# Patient Record
Sex: MALE | Race: WHITE | NOT HISPANIC OR LATINO | URBAN - METROPOLITAN AREA
[De-identification: names, ages, dates, MRNs, and addresses within clinical notes are randomized per-mention and may not be internally consistent; named-entity substitution may affect disease eponyms.]

---

## 2021-03-10 ENCOUNTER — APPOINTMENT (RX ONLY)
Dept: URBAN - METROPOLITAN AREA CLINIC 116 | Facility: CLINIC | Age: 71
Setting detail: DERMATOLOGY
End: 2021-03-10

## 2021-03-10 DIAGNOSIS — L30.9 DERMATITIS, UNSPECIFIED: ICD-10-CM | Status: INADEQUATELY CONTROLLED

## 2021-03-10 DIAGNOSIS — L29.89 OTHER PRURITUS: ICD-10-CM | Status: INADEQUATELY CONTROLLED

## 2021-03-10 DIAGNOSIS — D485 NEOPLASM OF UNCERTAIN BEHAVIOR OF SKIN: ICD-10-CM

## 2021-03-10 PROBLEM — L29.8 OTHER PRURITUS: Status: ACTIVE | Noted: 2021-03-10

## 2021-03-10 PROBLEM — D48.5 NEOPLASM OF UNCERTAIN BEHAVIOR OF SKIN: Status: ACTIVE | Noted: 2021-03-10

## 2021-03-10 PROCEDURE — ? PRESCRIPTION MEDICATION MANAGEMENT

## 2021-03-10 PROCEDURE — 69100 BIOPSY OF EXTERNAL EAR: CPT

## 2021-03-10 PROCEDURE — ? PRESCRIPTION

## 2021-03-10 PROCEDURE — ? ADDITIONAL NOTES

## 2021-03-10 PROCEDURE — ? COUNSELING

## 2021-03-10 PROCEDURE — 11103 TANGNTL BX SKIN EA SEP/ADDL: CPT | Mod: 59

## 2021-03-10 PROCEDURE — ? FOLLOW UP ORDERS

## 2021-03-10 PROCEDURE — ? BIOPSY BY SHAVE METHOD

## 2021-03-10 PROCEDURE — 11102 TANGNTL BX SKIN SINGLE LES: CPT | Mod: 59

## 2021-03-10 PROCEDURE — 99203 OFFICE O/P NEW LOW 30 MIN: CPT | Mod: 25

## 2021-03-10 RX ORDER — TRIAMCINOLONE ACETONIDE 1 MG/G
1 CREAM TOPICAL BID
Qty: 1 | Refills: 0 | Status: ERX | COMMUNITY
Start: 2021-03-10

## 2021-03-10 RX ADMIN — TRIAMCINOLONE ACETONIDE 1: 1 CREAM TOPICAL at 00:00

## 2021-03-10 ASSESSMENT — LOCATION SIMPLE DESCRIPTION DERM
LOCATION SIMPLE: LEFT THIGH
LOCATION SIMPLE: RIGHT FOREARM
LOCATION SIMPLE: RIGHT THIGH
LOCATION SIMPLE: LEFT FOREARM
LOCATION SIMPLE: LEFT EAR

## 2021-03-10 ASSESSMENT — LOCATION DETAILED DESCRIPTION DERM
LOCATION DETAILED: LEFT CYMBA CONCHA
LOCATION DETAILED: LEFT DISTAL DORSAL FOREARM
LOCATION DETAILED: RIGHT DISTAL RADIAL DORSAL FOREARM
LOCATION DETAILED: RIGHT ANTERIOR PROXIMAL THIGH
LOCATION DETAILED: LEFT ANTERIOR PROXIMAL THIGH
LOCATION DETAILED: RIGHT ANTERIOR DISTAL THIGH
LOCATION DETAILED: RIGHT PROXIMAL ULNAR DORSAL FOREARM

## 2021-03-10 ASSESSMENT — LOCATION ZONE DERM
LOCATION ZONE: ARM
LOCATION ZONE: EAR
LOCATION ZONE: LEG

## 2021-03-10 NOTE — PROCEDURE: ADDITIONAL NOTES
Detail Level: Detailed
Render Risk Assessment In Note?: no
Additional Notes: Patient is returning up [facility] soon.

## 2021-03-10 NOTE — PROCEDURE: FOLLOW UP ORDERS
Detail Level: Zone
Follow-Up Preamble: The following orders were made during the visit:
Follow-Up (Free Text): Patient is to follow up with his dermatologist up [facility]. Patient states he has an appointment scheduled for April to see his dermatologist up [facility] to follow up on dermatitis.

## 2021-03-10 NOTE — PROCEDURE: BIOPSY BY SHAVE METHOD
Detail Level: Detailed
Depth Of Biopsy: dermis
Was A Bandage Applied: Yes
Size Of Lesion In Cm: 0
Biopsy Type: H and E
Biopsy Method: Dermablade
Anesthesia Type: 1% lidocaine with epinephrine
Anesthesia Volume In Cc (Will Not Render If 0): 0.5
Additional Anesthesia Type: 0.5% lidocaine and 0.5% bupivacaine in a 1:1 solution with 1:200,000 epinephrine and a 1:10 solution of 8.4% sodium bicarbonate
Hemostasis: Aluminum Chloride
Wound Care: Petrolatum
Dressing: bandage
Destruction After The Procedure: No
Type Of Destruction Used: Curettage
Curettage Text: The wound bed was treated with curettage after the biopsy was performed.
Cryotherapy Text: The wound bed was treated with cryotherapy after the biopsy was performed.
Electrodesiccation Text: The wound bed was treated with electrodesiccation after the biopsy was performed.
Electrodesiccation And Curettage Text: The wound bed was treated with electrodesiccation and curettage after the biopsy was performed.
Silver Nitrate Text: The wound bed was treated with silver nitrate after the biopsy was performed.
Lab: [facility]
Lab Facility: [id number]
Consent: The provider's intent is to obtain a tissue sample solely for diagnostic purposes. Written consent to obtain tissue sample was obtained and risks were reviewed including but not limited to scarring, infection, bleeding, scabbing, incomplete removal, nerve damage and allergy to anesthesia.
Post-Care Instructions: I reviewed with the patient in detail post-care instructions. Patient is to keep the biopsy site dry overnight, and then apply bacitracin twice daily until healed. Patient may apply hydrogen peroxide soaks to remove any crusting.
Notification Instructions: Patient will be notified of biopsy results. However, patient instructed to call the office if not contacted within 2 weeks.
Billing Type: [employer]
Information: Selecting Yes will display possible errors in your note based on the variables you have selected. This validation is only offered as a suggestion for you. PLEASE NOTE THAT THE VALIDATION TEXT WILL BE REMOVED WHEN YOU FINALIZE YOUR NOTE. IF YOU WANT TO FAX A PRELIMINARY NOTE YOU WILL NEED TO TOGGLE THIS TO 'NO' IF YOU DO NOT WANT IT IN YOUR FAXED NOTE.
Lab: [id number]
Lab Facility: [id number]
Billing Type: Third-Party Bill
Size Of Lesion In Cm: 1.5
Biopsy Method: Personna blade

## 2021-03-10 NOTE — PROCEDURE: PRESCRIPTION MEDICATION MANAGEMENT
Detail Level: Zone
Initiate Treatment: triamcinolone acetonide 0.1 % topical cream: Apply to AA BID X 2 weeks break for 2 weeks repeat cycle as needed (never to face)\\n\\nPatient is to follow a strict sunscreen regimen and sensitive skin regimen
Render In Strict Bullet Format?: No

## 2021-03-16 ENCOUNTER — RX ONLY (OUTPATIENT)
Age: 71
Setting detail: RX ONLY
End: 2021-03-16

## 2021-03-16 RX ORDER — CLOBETASOL PROPIONATE 0.5 MG/G
1 CREAM TOPICAL BID
Qty: 1 | Refills: 0 | Status: ERX | COMMUNITY
Start: 2021-03-16

## 2023-10-02 ENCOUNTER — TREATMENT (OUTPATIENT)
Dept: PHYSICAL THERAPY | Facility: CLINIC | Age: 73
End: 2023-10-02
Payer: MEDICARE

## 2023-10-02 DIAGNOSIS — R26.9 GAIT ABNORMALITY: Primary | ICD-10-CM

## 2023-10-02 DIAGNOSIS — M54.16 LUMBAR RADICULITIS: ICD-10-CM

## 2023-10-02 DIAGNOSIS — M25.60 STIFFNESS OF UNSPECIFIED JOINT, NOT ELSEWHERE CLASSIFIED: ICD-10-CM

## 2023-10-02 DIAGNOSIS — R29.898 WEAKNESS OF BOTH HIPS: ICD-10-CM

## 2023-10-02 DIAGNOSIS — M54.50 LOW BACK PAIN: ICD-10-CM

## 2023-10-02 PROCEDURE — 97113 AQUATIC THERAPY/EXERCISES: CPT | Mod: GP

## 2023-10-02 NOTE — PROGRESS NOTES
"Physical Therapy    Physical Therapy Treatment    Patient Name: Darius Walton  MRN: [id number]  Today's Date: 10/2/2023  Time Calculation  Start Time: 905  Stop Time: 930  Time Calculation (min): 25 min  Insurance    Insurance reviewed   Visit number: 10  Approved number of visits: 10   Authorization date range: 11/10/23   Authorization required after evaluation  Beginnin2023 Endin2023  Insurance        Assessment: Pt demos improved trunk and core stability and control.       Plan: Extend POC      Current Problem  1. Gait abnormality        2. Low back pain        3. Lumbar radiculitis        4. Weakness of both hips                            Subjective   Pt states that he just returned from vacation and had no increased in overall LBP or radicular symptoms .       Precaution : none       Pain 2/10        Objective   Fair  DLS with paddle resistance level 5         Treatments:     Aquatic Therapy (97524): timed minutes 30, units 2 .   9:05-9:30  Aquatic Gait Activities: (fwd, bkwd, lat) 2 laps each  March in Place w/AB: x20  HR/TR: x20  Hip PRE's (abd, ext, flex): 2 x10 each, ROB  Squats: x10  SKTC: 15\" x2 ROB  HS Stretch: 15\" x2, ROB  Noodle Rotation: x5 each  Noodle Press Down: x10  Staggered Stance Row: paddles, Lv5 x20  DLS w/UE movement: Level 5 x10 each.   DB SL UE Flexion/ABD Double blue x10 each      "

## 2023-10-02 NOTE — Clinical Note
October 2, 2023     Patient: Darius Walton   YOB: 1950   Date of Visit: 10/2/2023       To Whom It May Concern:    It is my medical opinion that Darius Walton {Work release (duty restriction):88546}.    If you have any questions or concerns, please don't hesitate to call.         Sincerely,        Lory Francois, PTA    CC: No Recipients

## 2023-10-02 NOTE — Clinical Note
October 2, 2023     Patient: Darius Walton   YOB: 1950   Date of Visit: 10/2/2023       To Whom it May Concern:    Darius Walton was seen in my clinic on 10/2/2023. He {Return to school/sport:88134}.    If you have any questions or concerns, please don't hesitate to call.         Sincerely,          Lory Francois, PTA        CC: No Recipients

## 2023-10-05 PROBLEM — M25.651 STIFFNESS OF RIGHT HIP JOINT: Status: ACTIVE | Noted: 2023-10-05

## 2023-10-05 PROBLEM — M25.60 JOINT STIFFNESS OF SPINE: Status: ACTIVE | Noted: 2023-10-05

## 2023-10-05 RX ORDER — IBUPROFEN 600 MG/1
600 TABLET ORAL EVERY 8 HOURS PRN
COMMUNITY
Start: 2023-08-24

## 2023-10-05 RX ORDER — PREDNISONE 10 MG/1
3 TABLET ORAL
COMMUNITY
Start: 2022-04-01

## 2023-10-05 RX ORDER — MUPIROCIN 20 MG/G
OINTMENT TOPICAL
COMMUNITY
Start: 2022-11-04

## 2023-10-05 RX ORDER — FLUCONAZOLE 100 MG/1
100 TABLET ORAL
COMMUNITY
Start: 2022-04-22

## 2023-10-05 RX ORDER — PRAVASTATIN SODIUM 40 MG/1
40 TABLET ORAL NIGHTLY
COMMUNITY
Start: 2023-07-17

## 2023-10-05 RX ORDER — TIZANIDINE 4 MG/1
4 TABLET ORAL EVERY 8 HOURS PRN
COMMUNITY
Start: 2023-08-08

## 2023-10-05 RX ORDER — METHYLPREDNISOLONE 4 MG/1
TABLET ORAL
COMMUNITY
Start: 2023-07-12

## 2023-10-05 RX ORDER — METHOTREXATE 2.5 MG/1
TABLET ORAL
COMMUNITY
Start: 2023-03-02

## 2023-10-05 RX ORDER — DUPILUMAB 300 MG/2ML
INJECTION, SOLUTION SUBCUTANEOUS
COMMUNITY
Start: 2023-09-08

## 2023-10-05 RX ORDER — NAPROXEN 500 MG/1
500 TABLET ORAL 2 TIMES DAILY PRN
COMMUNITY
Start: 2022-11-07

## 2023-10-05 RX ORDER — FLUOCINOLONE ACETONIDE 0.11 MG/ML
OIL TOPICAL
COMMUNITY
Start: 2023-04-20

## 2023-10-05 RX ORDER — BETAMETHASONE DIPROPIONATE 0.5 MG/G
CREAM TOPICAL
COMMUNITY
Start: 2023-01-23

## 2023-10-05 RX ORDER — LISINOPRIL 5 MG/1
TABLET ORAL
COMMUNITY
Start: 2023-09-29

## 2023-10-05 RX ORDER — DOXAZOSIN 2 MG/1
2 TABLET ORAL DAILY
COMMUNITY
Start: 2023-08-20

## 2023-10-05 RX ORDER — DESOXIMETASONE 2.5 MG/G
1 CREAM TOPICAL
COMMUNITY
Start: 2022-01-28

## 2023-10-05 RX ORDER — FOLIC ACID 1 MG/1
1 TABLET ORAL DAILY
COMMUNITY
Start: 2023-03-30

## 2023-10-06 ENCOUNTER — TELEPHONE (OUTPATIENT)
Dept: PHYSICAL THERAPY | Facility: CLINIC | Age: 73
End: 2023-10-06

## 2023-10-06 ENCOUNTER — TREATMENT (OUTPATIENT)
Dept: PHYSICAL THERAPY | Facility: CLINIC | Age: 73
End: 2023-10-06
Payer: MEDICARE

## 2023-10-06 DIAGNOSIS — M25.60 JOINT STIFFNESS OF SPINE: ICD-10-CM

## 2023-10-06 DIAGNOSIS — M54.50 LOW BACK PAIN: ICD-10-CM

## 2023-10-06 DIAGNOSIS — M54.16 LUMBAR RADICULITIS: Primary | ICD-10-CM

## 2023-10-06 PROCEDURE — 97110 THERAPEUTIC EXERCISES: CPT | Mod: GP | Performed by: GENERAL ACUTE CARE HOSPITAL

## 2023-10-06 PROCEDURE — 97014 ELECTRIC STIMULATION THERAPY: CPT | Mod: GP | Performed by: GENERAL ACUTE CARE HOSPITAL

## 2023-10-06 PROCEDURE — 97140 MANUAL THERAPY 1/> REGIONS: CPT | Mod: GP | Performed by: GENERAL ACUTE CARE HOSPITAL

## 2023-10-06 ASSESSMENT — PAIN SCALES - GENERAL: PAINLEVEL_OUTOF10: 3

## 2023-10-06 ASSESSMENT — ENCOUNTER SYMPTOMS
OCCASIONAL FEELINGS OF UNSTEADINESS: 0
LOSS OF SENSATION IN FEET: 1
DEPRESSION: 0

## 2023-10-06 NOTE — PROGRESS NOTES
Physical Therapy    Physical Therapy Treatment    Patient Name: Darius Walton  MRN: [id number]  Today's Date: 10/6/2023      Current Problem  1. Lumbar radiculitis        2. Joint stiffness of spine        3. Low back pain               Precautions  Precautions  STEADI Fall Risk Score (The score of 4 or more indicates an increased risk of falling): 4  Vital Signs     Pain  Pain Score: 3 (sore)    Change in function: able to golf with soreness  Patient comments: sore   Objective: HEP review   Treatment:  Therapeutic exercise (25842): timed minutes 10 . nustep   stretches.   Manual Therapy (10675): timed minutes 35 . STM LS paraspinals right glute and glute med   UPA Bilat L1L2L3   IDN L3L4L5 bilateral with estim. glute med sup gluteal thoracic/lumbar paraspinals left   Iliacus release.   Modalities: untimed minutes 10 . estim with IDN.    Assessment:  Patient educated in dry needling precautions, indications, and contraindications. Patient is aware of the risks and benefits of procedure and wishes to have it performed. No adverse reaction to the dry needling noted.   Patient notes improved pain levels resultant from activities in therapy session. Improved tissue quality noted as well as body mechanics demonstrated after cueing and corrections. Patient continues to require active therapy to progress functional capabilities with decreasing pain levels and improving mechanics with functional activities including progression of Home exercise program. Tolerance to today's treatment was good. Muscle fatigue noted.  Patient appears motivated and compliant this date, demonstrating a working understanding of principals instructed and home program.  Plan:  Progress with functional strength as tolerated with respect to tissue healing. Manual therapy PRN to improve/maintain ROM, prevent adhesion, reduce pain.

## 2023-10-09 ENCOUNTER — TREATMENT (OUTPATIENT)
Dept: PHYSICAL THERAPY | Facility: CLINIC | Age: 73
End: 2023-10-09
Payer: MEDICARE

## 2023-10-09 DIAGNOSIS — M25.60 JOINT STIFFNESS OF SPINE: ICD-10-CM

## 2023-10-09 DIAGNOSIS — M54.50 LOW BACK PAIN: ICD-10-CM

## 2023-10-09 DIAGNOSIS — M54.16 LUMBAR RADICULITIS: Primary | ICD-10-CM

## 2023-10-09 PROCEDURE — 97113 AQUATIC THERAPY/EXERCISES: CPT | Mod: GP,CQ

## 2023-10-09 ASSESSMENT — PAIN SCALES - GENERAL: PAINLEVEL_OUTOF10: 2

## 2023-10-09 ASSESSMENT — PAIN - FUNCTIONAL ASSESSMENT: PAIN_FUNCTIONAL_ASSESSMENT: 0-10

## 2023-10-09 NOTE — PROGRESS NOTES
"Physical Therapy    Physical Therapy Treatment    Patient Name: Darius Walton  MRN: [id number]  Today's Date: 10/9/2023  Time Calculation  Start Time: 0930  Stop Time: 1000  Time Calculation (min): 30 min      Assessment:   Pt demos improved core and trunk stabilization .    Plan:   Progress as able     Current Problem  1. Lumbar radiculitis        2. Joint stiffness of spine        3. Low back pain            Subjective   General   States that he went golfing over the weekend and did really well. Had increased walking duration without significant increased pain.  Precautions  Precautions  Precautions Comment: No recent falls    Pain  Pain Assessment: 0-10  Pain Score: 2    Objective   Fair + DLS     Treatments:  Aquatic Therapy (95278): timed minutes 30, units 2 .   Aquatic Gait Activities: (fwd, bkwd, lat) 2 laps each  March in Place w/AB: x20  HR/TR: x20  Hip PRE's (abd, ext, flex): 2 x10 each, ROB  Squats: x10  SKTC: 15\" x2 ROB  HS Stretch: 15\" x2, ROB  Noodle Rotation: x5 each  Noodle Press Down: x10 knot   Staggered Stance Row: paddles, Lv5 x20 NV   DB DLS SL White and black DB flexion /abduction x10 each  DLS w/UE movement: Level 5 x10 each.   NV      "

## 2023-10-12 ENCOUNTER — TREATMENT (OUTPATIENT)
Dept: PHYSICAL THERAPY | Facility: CLINIC | Age: 73
End: 2023-10-12
Payer: MEDICARE

## 2023-10-12 DIAGNOSIS — M54.16 LUMBAR RADICULITIS: Primary | ICD-10-CM

## 2023-10-12 DIAGNOSIS — M54.50 LOW BACK PAIN: ICD-10-CM

## 2023-10-12 DIAGNOSIS — M25.60 JOINT STIFFNESS OF SPINE: ICD-10-CM

## 2023-10-12 PROCEDURE — 97110 THERAPEUTIC EXERCISES: CPT | Mod: GP,CQ

## 2023-10-12 PROCEDURE — 97140 MANUAL THERAPY 1/> REGIONS: CPT | Mod: GP,CQ

## 2023-10-12 ASSESSMENT — PAIN SCALES - GENERAL: PAINLEVEL_OUTOF10: 2

## 2023-10-12 ASSESSMENT — PAIN - FUNCTIONAL ASSESSMENT: PAIN_FUNCTIONAL_ASSESSMENT: 0-10

## 2023-10-12 NOTE — PROGRESS NOTES
Physical Therapy    Physical Therapy Treatment    Patient Name: Darius Walton  MRN: [id number]  Today's Date: 10/12/2023  Time Calculation  Start Time: 0900  Stop Time: 0945  Time Calculation (min): 45 min      Assessment:  Improved tissue quality noted post MTT.    Plan:  Cont with POC     Current Problem  1. Lumbar radiculitis        2. Joint stiffness of spine        3. Low back pain            Subjective   States that he is stiff and sore today . C/o R posterior shld soreness as well due to lifting something out of trunk possibly straining shld.    Precautions  Precautions  Precautions Comment: No recent Falls    Pain  Pain Assessment: 0-10  Pain Score: 2 (Soreness more than pain)    Objective   + R glute spasm  Treatments:    Therapeutic exercise (64497): 10 minutes 1 unit  Nustep  5 minutes   HS/Hip flexor standing 1x30 each   Supine trunk rotation with SB 5 seconds x10        MTT (04608) :35 minutes 2 units        Hip flexor /Iliacus release R Glute STM/DTM . IASTM Lumbar paraspinals. CHR R QL .MFR across lumbar spine .

## 2023-10-16 ENCOUNTER — TREATMENT (OUTPATIENT)
Dept: PHYSICAL THERAPY | Facility: CLINIC | Age: 73
End: 2023-10-16
Payer: MEDICARE

## 2023-10-16 DIAGNOSIS — M54.50 LOW BACK PAIN: Chronic | ICD-10-CM

## 2023-10-16 DIAGNOSIS — M25.60 STIFFNESS OF UNSPECIFIED JOINT, NOT ELSEWHERE CLASSIFIED: ICD-10-CM

## 2023-10-16 DIAGNOSIS — M54.16 LUMBAR RADICULITIS: Primary | ICD-10-CM

## 2023-10-16 DIAGNOSIS — M25.60 JOINT STIFFNESS OF SPINE: ICD-10-CM

## 2023-10-16 PROCEDURE — 97113 AQUATIC THERAPY/EXERCISES: CPT | Mod: GP,CQ

## 2023-10-16 ASSESSMENT — PAIN SCALES - GENERAL: PAINLEVEL_OUTOF10: 2

## 2023-10-16 ASSESSMENT — PAIN - FUNCTIONAL ASSESSMENT: PAIN_FUNCTIONAL_ASSESSMENT: 0-10

## 2023-10-16 NOTE — PROGRESS NOTES
"Physical Therapy    Physical Therapy Treatment    Patient Name: Darius Walton  MRN: [id number]  Today's Date: 10/16/2023  Time Calculation  Start Time: 0930  Stop Time: 0100  Time Calculation (min): 930 min      Assessment:  Pt demos improved trunk and core control .    Plan:  Cont with POC    Current Problem  1. Lumbar radiculitis        2. Joint stiffness of spine        3. Low back pain            Subjective   Symptoms still remain per pt.  Precautions  Precautions  Precautions Comment: No recent falls    Pain  Pain Assessment: 0-10  Pain Score: 2    Objective   Fair DLS     Treatments:  Aquatic Therapy (17375): timed minutes 30, units 2 .   Aquatic Gait Activities: (fwd, bkwd, lat) 2 laps each  March in Place w/AB: x20  HR/TR: x20  Hip PRE's (abd, ext, flex): 2 x10 each, ROB  Squats: x10  SKTC: 15\" x2 ROB  HS Stretch: 15\" x2, ROB  Noodle Rotation: x5 each  Noodle Press Down: x20 knot   Staggered Stance Row: paddles, Lv5 x20 NV   DB DLS SL White and black DB flexion /abduction x10 each  DLS w/UE movement: Level 5 x10 each.   NV              "

## 2023-10-20 ENCOUNTER — TREATMENT (OUTPATIENT)
Dept: PHYSICAL THERAPY | Facility: CLINIC | Age: 73
End: 2023-10-20
Payer: MEDICARE

## 2023-10-20 DIAGNOSIS — M54.16 LUMBAR RADICULITIS: ICD-10-CM

## 2023-10-20 DIAGNOSIS — M54.50 LOW BACK PAIN: ICD-10-CM

## 2023-10-20 DIAGNOSIS — M25.60 JOINT STIFFNESS OF SPINE: Primary | ICD-10-CM

## 2023-10-20 PROCEDURE — 97140 MANUAL THERAPY 1/> REGIONS: CPT | Mod: GP | Performed by: GENERAL ACUTE CARE HOSPITAL

## 2023-10-20 PROCEDURE — 97110 THERAPEUTIC EXERCISES: CPT | Mod: GP | Performed by: GENERAL ACUTE CARE HOSPITAL

## 2023-10-20 PROCEDURE — 97014 ELECTRIC STIMULATION THERAPY: CPT | Mod: GP | Performed by: GENERAL ACUTE CARE HOSPITAL

## 2023-10-20 NOTE — PROGRESS NOTES
Patient Name: Darius Watlon  MRN: [id number]  Today's Date: 10/20/2023     Current Problem:  1. Joint stiffness of spine        2. Lumbar radiculitis        3. Low back pain            Subjective:  Change in pain/ pain level: 3/10  Change in function: stiff in the am   Patient comments: play golf much better in the afternoon      Therapeutic exercise (89384): timed minutes 10 . nustep   stretches.   Manual Therapy (54477): timed minutes 35 . STM LS paraspinals right glute and glute med   UPA Bilat L1L2L3   IDN L3L4L5 bilateral with estim. glute med sup gluteal thoracic/lumbar paraspinals left   Iliacus release.   Modalities:  estim with IDN    Assessment:  Patient educated in dry needling precautions, indications, and contraindications. Patient is aware of the risks and benefits of procedure and wishes to have it performed. No adverse reaction to the dry needling noted.   Patient notes improved pain levels resultant from activities in therapy session. Improved tissue quality noted as well as body mechanics demonstrated after cueing and corrections. Patient continues to require active therapy to progress functional capabilities with decreasing pain levels and improving mechanics with functional activities including progression of Home exercise program. Tolerance to today's treatment was good. Muscle fatigue noted.  Patient appears motivated and compliant this date, demonstrating a working understanding of principals instructed and home program.    Plan:  Progress with functional strength as tolerated with respect to tissue healing. Manual therapy PRN to improve/maintain ROM, prevent adhesion, reduce pain.

## 2023-10-23 ENCOUNTER — TREATMENT (OUTPATIENT)
Dept: PHYSICAL THERAPY | Facility: CLINIC | Age: 73
End: 2023-10-23
Payer: MEDICARE

## 2023-10-23 DIAGNOSIS — M54.50 LOW BACK PAIN: ICD-10-CM

## 2023-10-23 DIAGNOSIS — M25.60 JOINT STIFFNESS OF SPINE: Primary | ICD-10-CM

## 2023-10-23 DIAGNOSIS — M54.16 LUMBAR RADICULITIS: ICD-10-CM

## 2023-10-23 PROCEDURE — 97113 AQUATIC THERAPY/EXERCISES: CPT | Mod: GP,CQ

## 2023-10-23 ASSESSMENT — PAIN SCALES - GENERAL: PAINLEVEL_OUTOF10: 2

## 2023-10-23 ASSESSMENT — PAIN - FUNCTIONAL ASSESSMENT: PAIN_FUNCTIONAL_ASSESSMENT: 0-10

## 2023-10-23 NOTE — PROGRESS NOTES
"Patient Name: Darius Walton  MRN: [id number]  Today's Date: 10/23/2023  Time Calculation  Start Time: 0930  Stop Time: 1000  Time Calculation (min): 30 min    Subjective:  States that he continues to have LBP and stiffness but feels that PT is helping.    Objective:  Aquatic performance independently recalled 50% of current program.    Assessment:   Pt demos improved recall of proper form and technique with session .    Plan:   Continue with POC    Treatment :   Aquatic Therapy (96621): timed minutes 30, units 2 .   Aquatic Gait Activities: (fwd, bkwd, lat) 2 laps each  March in Place w/AB: x20  HR/TR: x20  Hip PRE's (abd, ext, flex): 2 x10 each, ROB  Squats: x10  SKTC: 15\" x2 ROB  HS Stretch: 15\" x2, ROB  Noodle Rotation: x5 each  Noodle Press Down: x20 knot   Staggered Stance Row: paddles, Lv5 x20 NV   DB DLS SL White and black DB flexion /abduction x10 each  DLS w/UE movement: Level 5 x10 each.   NV              Current Problem:  1. Joint stiffness of spine        2. Lumbar radiculitis        3. Low back pain                Pain:  Pain Assessment: 0-10  Pain Score: 2          Precautions:   Precautions  Precautions Comment: No recent falls     "

## 2023-10-27 ENCOUNTER — APPOINTMENT (OUTPATIENT)
Dept: PHYSICAL THERAPY | Facility: CLINIC | Age: 73
End: 2023-10-27
Payer: MEDICARE

## 2023-10-30 ENCOUNTER — TREATMENT (OUTPATIENT)
Dept: PHYSICAL THERAPY | Facility: CLINIC | Age: 73
End: 2023-10-30
Payer: MEDICARE

## 2023-10-30 DIAGNOSIS — M54.16 LUMBAR RADICULITIS: ICD-10-CM

## 2023-10-30 DIAGNOSIS — M54.50 LOW BACK PAIN: ICD-10-CM

## 2023-10-30 DIAGNOSIS — M25.60 JOINT STIFFNESS OF SPINE: Primary | ICD-10-CM

## 2023-10-30 PROCEDURE — 97113 AQUATIC THERAPY/EXERCISES: CPT | Mod: GP,CQ

## 2023-10-30 ASSESSMENT — PAIN SCALES - GENERAL: PAINLEVEL_OUTOF10: 0 - NO PAIN

## 2023-10-30 ASSESSMENT — PAIN - FUNCTIONAL ASSESSMENT: PAIN_FUNCTIONAL_ASSESSMENT: 0-10

## 2023-10-30 NOTE — PROGRESS NOTES
"Patient Name: Darius Walton  MRN: [id number]  Today's Date: 10/30/2023  Time Calculation  Start Time: 0930  Stop Time: 1000  Time Calculation (min): 30 min    Subjective:  Reports stiffness and soreness in LB. Denies any radicular pain currently.     Objective:  Good - DLS     Assessment:   Pt demos improved postural awareness and improved core and trunk stability.    Plan:   Cont with POC     Treatment :    Aquatic Therapy (08251): timed minutes 30, units 2 .   Aquatic Gait Activities: (fwd, bkwd, lat) 2 laps each  March in Place w/AB: x20  HR/TR: x20  Hip PRE's (abd, ext, flex): 2 x10 each, ROB  Squats: x10  SKTC: 15\" x2 ROB  HS Stretch: 15\" x2, ROB  Noodle Rotation: x5 each  Noodle Press Down: x20 knot   Staggered Stance Row: paddles, Lv5 x20 NV   DB DLS SL White and black DB flexion /abduction x10 each  DLS w/UE movement: Level 5 x10 each.       Current Problem:  1. Joint stiffness of spine        2. Lumbar radiculitis        3. Low back pain                Pain:  0/10 pain level currently   Location: LB   Description: Stiffness/Soreness in LB           Precautions:   Precautions  Precautions Comment: No recent falls     "

## 2023-11-02 ENCOUNTER — TREATMENT (OUTPATIENT)
Dept: PHYSICAL THERAPY | Facility: CLINIC | Age: 73
End: 2023-11-02
Payer: MEDICARE

## 2023-11-02 DIAGNOSIS — M25.60 JOINT STIFFNESS OF SPINE: Primary | ICD-10-CM

## 2023-11-02 DIAGNOSIS — M54.50 LOW BACK PAIN: ICD-10-CM

## 2023-11-02 DIAGNOSIS — R29.898 WEAKNESS OF BOTH HIPS: ICD-10-CM

## 2023-11-02 DIAGNOSIS — M25.60 STIFFNESS OF UNSPECIFIED JOINT, NOT ELSEWHERE CLASSIFIED: ICD-10-CM

## 2023-11-02 DIAGNOSIS — R26.9 GAIT ABNORMALITY: ICD-10-CM

## 2023-11-02 DIAGNOSIS — M54.16 LUMBAR RADICULITIS: ICD-10-CM

## 2023-11-02 PROCEDURE — 97140 MANUAL THERAPY 1/> REGIONS: CPT | Mod: GP,CQ

## 2023-11-02 PROCEDURE — 97110 THERAPEUTIC EXERCISES: CPT | Mod: GP,CQ

## 2023-11-02 ASSESSMENT — PAIN SCALES - GENERAL: PAINLEVEL_OUTOF10: 2

## 2023-11-02 ASSESSMENT — PAIN - FUNCTIONAL ASSESSMENT: PAIN_FUNCTIONAL_ASSESSMENT: 0-10

## 2023-11-02 NOTE — PROGRESS NOTES
Patient Name: Darius Walton  MRN: [id number]  Today's Date: 11/2/2023  Time Calculation  Start Time: 0145  Stop Time: 0225  Time Calculation (min): 40 min    Subjective:  Reports that he has been sitting for longer periods recently so he has increased LB tightness. Occasionally radicular symptoms with radiate down R LE into lateral foot.     Objective:  Gait fwd flexed     Assessment:   Improved upright posture post session.     Plan:   Cont with POC.  reeval with supervising PT next week.    Treatment :     THERAPEUTIC EXERCISE 91184 15 minutes 1 unit   Nustep 5 minutes   Standing hamstring/hip flexor/gs   Clamshells x10  MANUAL TECHNIQUES  93886 25 2 units    Stm lumbar paraspinals   Along iliac crest /glute med /piriformis   Current Problem:  1. Joint stiffness of spine        2. Lumbar radiculitis        3. Low back pain        4. Gait abnormality        5. Stiffness of unspecified joint, not elsewhere classified        6. Weakness of both hips                Pain:  Pain Assessment: 0-10  Pain Score: 2          Precautions:   Precautions  Precautions Comment: No recent falls

## 2023-11-06 ENCOUNTER — TREATMENT (OUTPATIENT)
Dept: PHYSICAL THERAPY | Facility: CLINIC | Age: 73
End: 2023-11-06
Payer: MEDICARE

## 2023-11-06 DIAGNOSIS — M54.50 LOW BACK PAIN: ICD-10-CM

## 2023-11-06 DIAGNOSIS — M54.16 LUMBAR RADICULITIS: ICD-10-CM

## 2023-11-06 DIAGNOSIS — R26.9 GAIT ABNORMALITY: ICD-10-CM

## 2023-11-06 DIAGNOSIS — M25.60 JOINT STIFFNESS OF SPINE: Primary | ICD-10-CM

## 2023-11-06 DIAGNOSIS — R29.898 WEAKNESS OF BOTH HIPS: ICD-10-CM

## 2023-11-06 DIAGNOSIS — M25.60 STIFFNESS OF UNSPECIFIED JOINT, NOT ELSEWHERE CLASSIFIED: ICD-10-CM

## 2023-11-06 PROCEDURE — 97113 AQUATIC THERAPY/EXERCISES: CPT | Mod: GP,CQ

## 2023-11-06 ASSESSMENT — PAIN DESCRIPTION - DESCRIPTORS: DESCRIPTORS: TIGHTNESS;SORE

## 2023-11-06 ASSESSMENT — PAIN - FUNCTIONAL ASSESSMENT: PAIN_FUNCTIONAL_ASSESSMENT: 0-10

## 2023-11-06 ASSESSMENT — PAIN SCALES - GENERAL: PAINLEVEL_OUTOF10: 0 - NO PAIN

## 2023-11-06 NOTE — PROGRESS NOTES
"Patient Name: Darius Walton  MRN: [id number]  Today's Date: 11/6/2023  Time Calculation  Start Time: 0930  Stop Time: 1000  Time Calculation (min): 30 min  Visit # 18    Subjective:  Reports that overall pain has improved since starting PT .    Objective:  Good trunk posture with 30 minute exercise duration.    Assessment:   Pt demos improved core and trunk control.    Plan:   Reeval NV with supervising PT      Treatment :    Aquatic Therapy (41282): timed minutes 30, units 2 .   Aquatic Gait Activities: (fwd, bkwd, lat) 2 laps each  March in Place w/AB: x20  HR/TR: x20  Hip PRE's (abd, ext, flex): 2 x10 each, ROB  Squats: x10  SKTC: 15\" x2 ROB  HS Stretch: 15\" x2, ROB  Noodle Rotation: x5 each  Noodle Press Down: x20 knot   Staggered Stance Row: paddles, Lv5 x20 NV   DB DLS SL White and black DB flexion /abduction x10 each  DLS w/UE movement: Level 5 x10 each.            Current Problem:  1. Joint stiffness of spine        2. Lumbar radiculitis        3. Low back pain        4. Gait abnormality        5. Stiffness of unspecified joint, not elsewhere classified        6. Weakness of both hips                Pain:  Pain Assessment: 0-10  Pain Score: 0 - No pain  Pain Location: Back  Pain Descriptors: Tightness, Sore        Precautions:   Precautions  Precautions Comment: No recent falls     "

## 2023-11-09 ENCOUNTER — TREATMENT (OUTPATIENT)
Dept: PHYSICAL THERAPY | Facility: CLINIC | Age: 73
End: 2023-11-09
Payer: MEDICARE

## 2023-11-09 DIAGNOSIS — M54.50 LOW BACK PAIN: ICD-10-CM

## 2023-11-09 DIAGNOSIS — M54.16 LUMBAR RADICULITIS: ICD-10-CM

## 2023-11-09 DIAGNOSIS — M25.60 JOINT STIFFNESS OF SPINE: ICD-10-CM

## 2023-11-09 PROCEDURE — 97140 MANUAL THERAPY 1/> REGIONS: CPT | Mod: GP | Performed by: GENERAL ACUTE CARE HOSPITAL

## 2023-11-09 PROCEDURE — 97014 ELECTRIC STIMULATION THERAPY: CPT | Mod: GP | Performed by: GENERAL ACUTE CARE HOSPITAL

## 2023-11-09 PROCEDURE — 97110 THERAPEUTIC EXERCISES: CPT | Mod: GP | Performed by: GENERAL ACUTE CARE HOSPITAL

## 2023-11-09 NOTE — PROGRESS NOTES
Patient Name: Darius Walton  MRN: [id number]  Today's Date: 11/9/2023   Visit 20/25  Current Problem:  1. Joint stiffness of spine  Follow Up In Physical Therapy      2. Lumbar radiculitis  Follow Up In Physical Therapy      3. Low back pain  Follow Up In Physical Therapy          Subjective:  Change in pain/ pain level: 4/10  Change in function: still very stiff in the am   Patient comments: water really helps    Treatment:    Therapeutic exercise (06006):  nustep   stretches.   Manual Therapy (24638): timed minutes 35 . STM LS paraspinals right glute and glute med   UPA Bilat L1L2L3   IDN L3L4L5 bilateral with estim. glute med sup gluteal thoracic/lumbar paraspinals left   Iliacus release.   Modalities:  estim with IDN    Assessment:  functional goals and would benefit from continued skilled services to further attain documented goals. The patient requires education and training to ensure optimal outcomes and reach remaining functional goals. The remaining functional goals are expected to be met in a reasonable time frame.  Patient appears motivated and compliant this date, demonstrating a working understanding of principals instructed and home program.  Advised pt to join fitness center to do pool on own- appears to agree and will look into it.   Plan:  Progress with functional strength as tolerated with respect to tissue healing. Manual therapy PRN to improve/maintain ROM, prevent adhesion, reduce pain.   Functional progression can be further made by continuing physical therapy with skilled interventions provided by a physical therapist. The patient has demonstrated sustained progress toward

## 2023-11-13 ENCOUNTER — TREATMENT (OUTPATIENT)
Dept: PHYSICAL THERAPY | Facility: CLINIC | Age: 73
End: 2023-11-13
Payer: MEDICARE

## 2023-11-13 DIAGNOSIS — M25.60 JOINT STIFFNESS OF SPINE: ICD-10-CM

## 2023-11-13 DIAGNOSIS — M54.50 LOW BACK PAIN: ICD-10-CM

## 2023-11-13 DIAGNOSIS — M54.16 LUMBAR RADICULITIS: ICD-10-CM

## 2023-11-13 PROCEDURE — 97113 AQUATIC THERAPY/EXERCISES: CPT | Mod: GP,CQ

## 2023-11-13 NOTE — PROGRESS NOTES
"Patient Name: Darius Walton  MRN: [id number]  Today's Date: 11/13/23  Start time 9:00   Stop time 9:30  Total time 30 minutes     Subjective:  States that he plans on continuing aquatics independently post discharged.     Objective:  Aquatic performance independently recalled 70% of current aquatic program .    Assessment:   Pt demos improved postural awareness and improved ability to recall aquatic program today.    Plan:   Cont with POC .    Treatment :    Aquatic Therapy (74657): timed minutes 30, units 2 .   Aquatic Gait Activities: (fwd, bkwd, lat) 2 laps each  March in Place w/AB: x20  HR/TR: x20  Hip PRE's (abd, ext, flex): 2 x10 each, ROB  Squats: x10  SKTC: 15\" x2 ROB  HS Stretch: 15\" x2, ROB  Noodle Rotation: x5 each  Noodle Press Down: x20 knot   Staggered Stance Row: paddles, Lv5 x20 NV   DB DLS SL White and black DB flexion /abduction x10 each  DLS w/UE movement: Level 5 x10 each.             Current Problem:  1. Joint stiffness of spine  Follow Up In Physical Therapy      2. Lumbar radiculitis  Follow Up In Physical Therapy      3. Low back pain  Follow Up In Physical Therapy           Pain:   2/10    Location: LB   Description: Stiff      Precautions:   No recent falls         "

## 2023-11-16 ENCOUNTER — APPOINTMENT (OUTPATIENT)
Dept: PHYSICAL THERAPY | Facility: CLINIC | Age: 73
End: 2023-11-16
Payer: MEDICARE

## 2023-11-20 ENCOUNTER — TREATMENT (OUTPATIENT)
Dept: PHYSICAL THERAPY | Facility: CLINIC | Age: 73
End: 2023-11-20
Payer: MEDICARE

## 2023-11-20 DIAGNOSIS — M54.16 LUMBAR RADICULITIS: ICD-10-CM

## 2023-11-20 DIAGNOSIS — M25.60 JOINT STIFFNESS OF SPINE: Primary | ICD-10-CM

## 2023-11-20 DIAGNOSIS — M54.50 LOW BACK PAIN: ICD-10-CM

## 2023-11-20 PROCEDURE — 97113 AQUATIC THERAPY/EXERCISES: CPT | Mod: GP,CQ

## 2023-11-20 NOTE — PROGRESS NOTES
"Patient Name: Darius Walton  MRN: [id number]  Today's Date: 11/20/2023  Time Calculation  Start Time: 0925  Stop Time: 0955  Time Calculation (min): 30 min    Subjective:  Reports that pain levels are low \"just stiff\" States that he plans on joining fitness center.     Objective:   Good DLS with level 5 paddles     Assessment:   Pt demos improved trunk and core control.     Plan:   1 more aquatic session.    Treatment :      Aquatic Therapy (58170):   timed minutes 30, units 2 .   Aquatic Gait Activities: (fwd, bkwd, lat) 2 laps each  March in Place w/AB: x20  HR/TR: x20  Hip PRE's (abd, ext, flex): 2 x10 each, ROB  Squats: x10  SKTC: 15\" x2 ROB  HS Stretch: 15\" x2, ROB  Noodle Rotation: x5 each  Noodle Press Down: x20 knot   Staggered Stance Row: paddles, Lv5 x20 NV   DB DLS SL White and black DB flexion /abduction x10 each  DLS w/UE movement: Level 5 x10 each.         Paddle swing level 5 (simulate golf swing)         Current Problem:  1. Joint stiffness of spine  Follow Up In Physical Therapy      2. Lumbar radiculitis  Follow Up In Physical Therapy      3. Low back pain  Follow Up In Physical Therapy              Pain:  2-3/10         Precautions: No recent falls .                                      "

## 2023-11-22 DIAGNOSIS — M54.16 LUMBAR RADICULITIS: ICD-10-CM

## 2023-11-22 DIAGNOSIS — M25.60 JOINT STIFFNESS OF SPINE: Primary | ICD-10-CM

## 2023-11-22 DIAGNOSIS — M54.50 LOW BACK PAIN: ICD-10-CM

## 2023-11-27 ENCOUNTER — APPOINTMENT (OUTPATIENT)
Dept: PHYSICAL THERAPY | Facility: CLINIC | Age: 73
End: 2023-11-27
Payer: MEDICARE

## 2023-11-30 ENCOUNTER — APPOINTMENT (OUTPATIENT)
Dept: PHYSICAL THERAPY | Facility: CLINIC | Age: 73
End: 2023-11-30
Payer: MEDICARE